# Patient Record
Sex: FEMALE | Race: WHITE | Employment: FULL TIME | URBAN - METROPOLITAN AREA
[De-identification: names, ages, dates, MRNs, and addresses within clinical notes are randomized per-mention and may not be internally consistent; named-entity substitution may affect disease eponyms.]

---

## 2017-05-05 ENCOUNTER — NURSE ONLY (OUTPATIENT)
Dept: FAMILY MEDICINE CLINIC | Facility: CLINIC | Age: 47
End: 2017-05-05

## 2017-05-05 VITALS
WEIGHT: 111 LBS | SYSTOLIC BLOOD PRESSURE: 110 MMHG | DIASTOLIC BLOOD PRESSURE: 64 MMHG | OXYGEN SATURATION: 98 % | TEMPERATURE: 98 F | RESPIRATION RATE: 16 BRPM | HEART RATE: 110 BPM

## 2017-05-05 DIAGNOSIS — R30.0 DYSURIA: ICD-10-CM

## 2017-05-05 DIAGNOSIS — N30.01 ACUTE CYSTITIS WITH HEMATURIA: Primary | ICD-10-CM

## 2017-05-05 PROCEDURE — 99202 OFFICE O/P NEW SF 15 MIN: CPT | Performed by: PHYSICIAN ASSISTANT

## 2017-05-05 PROCEDURE — 81003 URINALYSIS AUTO W/O SCOPE: CPT | Performed by: PHYSICIAN ASSISTANT

## 2017-05-05 RX ORDER — NITROFURANTOIN 25; 75 MG/1; MG/1
100 CAPSULE ORAL 2 TIMES DAILY
Qty: 14 CAPSULE | Refills: 0 | Status: SHIPPED | OUTPATIENT
Start: 2017-05-05 | End: 2017-05-12

## 2017-05-05 NOTE — PROGRESS NOTES
CHIEF COMPLAINT:     Patient presents with:  Urinary: started last night with burning, pressure near bladder area, denies abdominal pain, weaning off Lupron for fibroids       HPI:    Jazzy Escoto is a 55year old female who presents with symptoms of hour(s))  -URINALYSIS, AUTO, W/O SCOPE   Collection Time: 05/05/17 11:52 AM   Result Value Ref Range   GLUCOSE (URINE DIPSTICK) Negative Negative mg/dL   BILIRUBIN Negative Negative   KETONES (URINE DIPSTICK) trace Negative mg/dL   SPECIFIC GRAVITY 1.030 1

## 2017-09-13 ENCOUNTER — LAB SERVICES (OUTPATIENT)
Dept: OTHER | Age: 47
End: 2017-09-13

## 2017-09-13 ENCOUNTER — CHARTING TRANS (OUTPATIENT)
Dept: OTHER | Age: 47
End: 2017-09-13

## 2017-09-13 LAB
APPEARANCE: CLEAR
BILIRUBIN: NORMAL
COLOR: NORMAL
GLUCOSE U: NORMAL
KETONES: NORMAL
LEUKOCYTES: NORMAL
NITRITE: POSITIVE
OCCULT BLOOD: NORMAL
PH: 5
PROTEIN: NORMAL
URINE SPEC GRAVITY: 1
UROBILINOGEN: NORMAL

## 2017-09-21 LAB — BACTERIA UR CULT: NORMAL

## 2018-11-03 VITALS
HEIGHT: 59 IN | WEIGHT: 112.5 LBS | OXYGEN SATURATION: 98 % | BODY MASS INDEX: 22.68 KG/M2 | RESPIRATION RATE: 16 BRPM | DIASTOLIC BLOOD PRESSURE: 60 MMHG | HEART RATE: 91 BPM | SYSTOLIC BLOOD PRESSURE: 100 MMHG | TEMPERATURE: 98.1 F

## (undated) NOTE — MR AVS SNAPSHOT
[address]  [place] [address]  [phone]               Thank you for choosing us for your health care visit with [address]   We are glad to serve you and happy to provide you with this summary of your vi · The bladder holds urine until you are ready to let it out. · The urethra carries urine from the bladder out of the body.  It is shorter in women, so bacteria can move through it more easily. The urethra is longer in men, so a UTI is less likely to reach [facility]     Sign up for [facility], your secure online medical record. [facility] will allow you to access patient instructions from your recent visit,  view other health information, and more.  To sign up or find more information, go to [email or web address] increments are effective and add up over the week   2 ½ hours per week – spread out over time Use a reyes to keep you motivated   Don’t forget strength training with weights and resistance Set goals and track your progress   You don’t need to join a gym.